# Patient Record
Sex: FEMALE | Race: WHITE | URBAN - METROPOLITAN AREA
[De-identification: names, ages, dates, MRNs, and addresses within clinical notes are randomized per-mention and may not be internally consistent; named-entity substitution may affect disease eponyms.]

---

## 2023-09-26 ENCOUNTER — EMERGENCY (EMERGENCY)
Facility: HOSPITAL | Age: 33
LOS: 0 days | Discharge: HOME | End: 2023-09-26
Attending: STUDENT IN AN ORGANIZED HEALTH CARE EDUCATION/TRAINING PROGRAM
Payer: SELF-PAY

## 2023-09-26 VITALS
TEMPERATURE: 99 F | HEART RATE: 87 BPM | HEIGHT: 62 IN | DIASTOLIC BLOOD PRESSURE: 59 MMHG | WEIGHT: 102.07 LBS | RESPIRATION RATE: 18 BRPM | OXYGEN SATURATION: 99 % | SYSTOLIC BLOOD PRESSURE: 116 MMHG

## 2023-09-26 DIAGNOSIS — S09.90XA UNSPECIFIED INJURY OF HEAD, INITIAL ENCOUNTER: ICD-10-CM

## 2023-09-26 DIAGNOSIS — R11.10 VOMITING, UNSPECIFIED: ICD-10-CM

## 2023-09-26 DIAGNOSIS — M54.12 RADICULOPATHY, CERVICAL REGION: ICD-10-CM

## 2023-09-26 DIAGNOSIS — M54.2 CERVICALGIA: ICD-10-CM
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
## 2023-09-26 PROCEDURE — 99284 EMERGENCY DEPT VISIT MOD MDM: CPT

## 2023-09-26 PROCEDURE — 99283 EMERGENCY DEPT VISIT LOW MDM: CPT | Mod: 25

## 2023-09-26 PROCEDURE — 96372 THER/PROPH/DIAG INJ SC/IM: CPT

## 2023-09-26 RX ORDER — TIZANIDINE 4 MG/1
1 TABLET ORAL
Qty: 15 | Refills: 0
Start: 2023-09-26 | End: 2023-09-30

## 2023-09-26 RX ORDER — METHOCARBAMOL 500 MG/1
1500 TABLET, FILM COATED ORAL ONCE
Refills: 0 | Status: COMPLETED | OUTPATIENT
Start: 2023-09-26 | End: 2023-09-26

## 2023-09-26 RX ORDER — DEXAMETHASONE 0.5 MG/5ML
10 ELIXIR ORAL ONCE
Refills: 0 | Status: COMPLETED | OUTPATIENT
Start: 2023-09-26 | End: 2023-09-26

## 2023-09-26 RX ORDER — KETOROLAC TROMETHAMINE 30 MG/ML
30 SYRINGE (ML) INJECTION ONCE
Refills: 0 | Status: DISCONTINUED | OUTPATIENT
Start: 2023-09-26 | End: 2023-09-26

## 2023-09-26 RX ORDER — LIDOCAINE 4 G/100G
1 CREAM TOPICAL ONCE
Refills: 0 | Status: COMPLETED | OUTPATIENT
Start: 2023-09-26 | End: 2023-09-26

## 2023-09-26 RX ADMIN — Medication 30 MILLIGRAM(S): at 17:19

## 2023-09-26 RX ADMIN — Medication 8 MILLIGRAM(S): at 17:19

## 2023-09-26 RX ADMIN — LIDOCAINE 1 PATCH: 4 CREAM TOPICAL at 17:17

## 2023-09-26 RX ADMIN — METHOCARBAMOL 1500 MILLIGRAM(S): 500 TABLET, FILM COATED ORAL at 17:18
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
## 2023-09-26 NOTE — ED PROVIDER NOTE - PATIENT PORTAL LINK FT
You can access the FollowMyHealth Patient Portal offered by [facility] by registering at the following website: [email or web address]. By joining [employer]’s FollowMyHealth portal, you will also be able to view your health information using other applications (apps) compatible with our system.
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
## 2023-09-26 NOTE — ED PROVIDER NOTE - NSFOLLOWUPINSTRUCTIONS_ED_ALL_ED_FT
Please follow up with your primary care physician within 24-72 hours and return immediately if symptoms worsen.    Our Emergency Department Referral Coordinators will be reaching out to you in the next 24-48 hours from 9:00am to 5:00pm with a follow up appointment. Please expect a phone call from the hospital in that time frame. If you do not receive a call or if you have any questions or concerns, you can reach them at   [phone]     Concussion    WHAT YOU NEED TO KNOW:    A concussion is a mild brain injury. It is usually caused by a bump or blow to the head from a fall, a motor vehicle crash, or a sports injury. Sometimes being shaken forcefully may cause a concussion.    DISCHARGE INSTRUCTIONS:    Have someone call 911 for any of the following:     Someone tries to wake you and cannot do so.      You have a seizure, increasing confusion, or a change in personality.      Your speech becomes slurred, or you have new vision problems.    Return to the emergency department if:     You have sudden and new vision problems.      You have a severe headache that does not go away.      You have arm or leg weakness, numbness, or new problems with coordination.      You have blood or clear fluid coming out of the ears or nose.    Contact your healthcare provider if:     You have nausea or are vomiting.      You feel more sleepy than usual.      Your symptoms get worse.      Your symptoms last longer than 6 weeks after the injury.      You have questions or concerns about your condition or care.    Medicines: You may need any of the following:     Acetaminophen decreases pain and fever. It is available without a doctor's order. Ask how much to take and how often to take it. Follow directions. Read the labels of all other medicines you are using to see if they also contain acetaminophen, or ask your doctor or pharmacist. Acetaminophen can cause liver damage if not taken correctly. Do not use more than 4 grams (4,000 milligrams) total of acetaminophen in one day.       NSAIDs help decrease swelling and pain or fever. This medicine is available with or without a doctor's order. NSAIDs can cause stomach bleeding or kidney problems in certain people. If you take blood thinner medicine, always ask your healthcare provider if NSAIDs are safe for you. Always read the medicine label and follow directions.      Take your medicine as directed. Contact your healthcare provider if you think your medicine is not helping or if you have side effects. Tell him or her if you are allergic to any medicine. Keep a list of the medicines, vitamins, and herbs you take. Include the amounts, and when and why you take them. Bring the list or the pill bottles to follow-up visits. Carry your medicine list with you in case of an emergency.    Self-care: Concussion symptoms usually go away within about 10 days, but they may last longer. The following may be recommended to manage your symptoms:     Rest from physical and mental activities as directed. Mental activities are those that require thinking, concentration, and attention. You will need to rest until your symptoms are gone. Rest will allow you to recover from your concussion. Ask your healthcare provider when you can return to work and other daily activities.      Have someone stay with you for the first 24 hours after your injury. Your healthcare provider should be contacted if your symptoms get worse, or you develop new symptoms.      Do not participate in sports and physical activities until your healthcare provider says it is okay. They could make your symptoms worse or lead to another concussion. Your healthcare provider will tell you when it is okay for you to return to sports or physical activities. Ask for more information about sports concussions.    Prevent another concussion:     Wear protective sports equipment that fits properly. Helmets help decrease your risk for a serious brain injury. Talk to your healthcare provider about ways you can decrease your risk for a concussion if you play sports.      Wear your seatbelt every time you travel. This helps to decrease your risk for a head injury if you are in a car accident.     Follow up with your healthcare provider as directed: Write down your questions so you remember to ask them during your visits.        © Copyright [employer] 2019 All illustrations and images included in CareNotes are the copyrighted property of [employer] or [employer].         Cervical Radiculopathy    Close-up of the nerves of the cervical spine.   Cervical radiculopathy means that a nerve in the neck (a cervical nerve) is pinched or bruised. This can happen because of an injury to the cervical spine (vertebrae) in the neck, or as a normal part of getting older. This condition can cause pain or loss of feeling (numbness) that runs from your neck all the way down to your arm and fingers. Often, this condition gets better with rest. Treatment may be needed if the condition does not get better.      What are the causes?    •A neck injury.      •A bulging disk in your spine.      •Sudden muscle tightening (muscle spasms).      •Tight muscles in your neck due to overuse.      •Arthritis.      •Breakdown in the bones and joints of the spine (spondylosis) due to getting older.      •Bone spurs that form near the nerves in the neck.        What are the signs or symptoms?  •Pain. The pain may:  •Run from the neck to the arm and hand.      •Be very bad or irritating.      •Get worse when you move your neck.        •Loss of feeling or tingling in your arm or hand.      •Weakness in your arm or hand, in very bad cases.        How is this treated?    In many cases, treatment is not needed for this condition. With rest, the condition often gets better over time. If treatment is needed, options may include:  •Wearing a soft neck collar (cervical collar) for short periods of time.      •Doing exercises (physical therapy) to strengthen your neck muscles.      •Taking medicines.      •Having shots (injections) in your spine, in very bad cases.      •Having surgery. This may be needed if other treatments do not help. The type of surgery that is used will depend on the cause of your condition.        Follow these instructions at home:    If you have a soft neck collar:     •Wear it as told by your doctor. Take it off only as told by your doctor.    •Ask your doctor if you can take the collar off for cleaning and bathing. If you are allowed to take the collar off for cleaning or bathing:  •Follow instructions from your doctor about how to take off the collar safely.      •Clean the collar by wiping it with mild soap and water and drying it completely.      •Take out any removable pads in the collar every 1–2 days. Wash them by hand with soap and water. Let them air-dry completely before you put them back in the collar.      •Check your skin under the collar for redness or sores. If you see any, tell your doctor.          Managing pain       Bag of ice on a towel on the skin.       A heating pad for use on the painful area.     •Take over-the-counter and prescription medicines only as told by your doctor.    •If told, put ice on the painful area. To do this:  •If you have a soft neck collar, take if off as told by your doctor.      •Put ice in a plastic bag.      •Place a towel between your skin and the bag.      •Leave the ice on for 20 minutes, 2–3 times a day.      •Take off the ice if your skin turns bright red. This is very important. If you cannot feel pain, heat, or cold, you have a greater risk of damage to the area.      •If using ice does not help, you can try using heat. Use the heat source that your doctor recommends, such as a moist heat pack or a heating pad.  •Place a towel between your skin and the heat source.      •Leave the heat on for 20–30 minutes.      •Take off the heat if your skin turns bright red. This is very important. If you cannot feel pain, heat, or cold, you have a greater risk of getting burned.        •You may try a gentle neck and shoulder rub (massage).      Activity     •Rest as needed.      •Return to your normal activities when your doctor says that it is safe.      •Do exercises as told by your doctor or physical therapist.      •You may have to avoid lifting. Ask your doctor how much you can safely lift.      General instructions     •Use a flat pillow when you sleep.      • Do not drive while wearing a soft neck collar. If you do not have a soft neck collar, ask your doctor if it is safe to drive while your neck heals.      •Ask your doctor if you should avoid driving or using machines while you are taking your medicine.      • Do not smoke or use any products that contain nicotine or tobacco. If you need help quitting, ask your doctor.      •Keep all follow-up visits.        Contact a doctor if:    •Your condition does not get better with treatment.        Get help right away if:    •Your pain gets worse and medicine does not help.      •You lose feeling or feel weak in your hand, arm, face, or leg.      •You have a high fever.      •Your neck is stiff.      •You cannot control when you poop or pee (have incontinence).      •You have trouble with walking, balance, or talking.        Summary    •Cervical radiculopathy means that a nerve in the neck is pinched or bruised.      •A nerve can get pinched from a bulging disk, arthritis, an injury to the neck, or other causes.      •Symptoms include pain, tingling, or loss of feeling that goes from the neck to the arm or hand.      •Weakness in your arm or hand can happen in very bad cases.      •Treatment may include resting, wearing a soft neck collar, and doing exercises. You might need to take medicines for pain. In very bad cases, shots or surgery may be needed.      This information is not intended to replace advice given to you by your health care provider. Make sure you discuss any questions you have with your health care provider.      Document Revised: 06/23/2022 Document Reviewed: 06/23/2022    Elsevier Patient Education © 2023 [employer] Inc[employer]

## 2023-09-26 NOTE — ED ADULT NURSE NOTE - NURSING NEURO LEVEL OF CONSCIOUSNESS
alert and awake
60 y/o WM evaluated for colonoscopy.  ASA 2.  Plan IV sedation / GA backup.  Plan, benefits, foreseeable risks, viable alternatives discussed with patient and all his pertinent questions answered and he understands and elects to proceed.
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
## 2023-10-16 ENCOUNTER — APPOINTMENT (OUTPATIENT)
Dept: NEUROPSYCHOLOGY | Facility: CLINIC | Age: 33
End: 2023-10-16

## 2023-10-18 PROBLEM — Z00.00 ENCOUNTER FOR PREVENTIVE HEALTH EXAMINATION: Status: ACTIVE | Noted: 2023-10-18

## 2023-10-24 ENCOUNTER — APPOINTMENT (OUTPATIENT)
Dept: NEUROSURGERY | Facility: CLINIC | Age: 33
End: 2023-10-24

## 2024-02-05 ENCOUNTER — EMERGENCY (EMERGENCY)
Facility: HOSPITAL | Age: 34
LOS: 0 days | Discharge: HOME | End: 2024-02-05
Attending: EMERGENCY MEDICINE
Payer: COMMERCIAL

## 2024-02-05 VITALS
SYSTOLIC BLOOD PRESSURE: 101 MMHG | TEMPERATURE: 98 F | DIASTOLIC BLOOD PRESSURE: 68 MMHG | HEART RATE: 77 BPM | RESPIRATION RATE: 20 BRPM | OXYGEN SATURATION: 100 % | WEIGHT: 102.07 LBS

## 2024-02-05 DIAGNOSIS — R51.9 HEADACHE, UNSPECIFIED: ICD-10-CM

## 2024-02-05 DIAGNOSIS — M54.2 CERVICALGIA: ICD-10-CM

## 2024-02-05 PROCEDURE — 99284 EMERGENCY DEPT VISIT MOD MDM: CPT

## 2024-02-05 PROCEDURE — 96374 THER/PROPH/DIAG INJ IV PUSH: CPT

## 2024-02-05 PROCEDURE — 99284 EMERGENCY DEPT VISIT MOD MDM: CPT | Mod: 25

## 2024-02-05 RX ORDER — METHOCARBAMOL 500 MG/1
2 TABLET, FILM COATED ORAL
Qty: 10 | Refills: 0
Start: 2024-02-05 | End: 2024-02-09

## 2024-02-05 RX ORDER — KETOROLAC TROMETHAMINE 30 MG/ML
30 SYRINGE (ML) INJECTION ONCE
Refills: 0 | Status: DISCONTINUED | OUTPATIENT
Start: 2024-02-05 | End: 2024-02-05

## 2024-02-05 RX ORDER — METHOCARBAMOL 500 MG/1
500 TABLET, FILM COATED ORAL ONCE
Refills: 0 | Status: COMPLETED | OUTPATIENT
Start: 2024-02-05 | End: 2024-02-05

## 2024-02-05 RX ADMIN — Medication 40 MILLIGRAM(S): at 16:53

## 2024-02-05 RX ADMIN — Medication 30 MILLIGRAM(S): at 16:59

## 2024-02-05 RX ADMIN — METHOCARBAMOL 500 MILLIGRAM(S): 500 TABLET, FILM COATED ORAL at 16:54

## 2024-02-05 NOTE — ED PROVIDER NOTE - NSFOLLOWUPINSTRUCTIONS_ED_ALL_ED_FT
SEE YOUR DOCTOR IN THE NEXT 24-48 HOURS   RETURN FOR ANY FURTHER CONCERNS     Closed Head Injury    A closed head injury is an injury to your head that may or may not involve a traumatic brain injury (TBI).  A CT scan of the head may not have been performed because they are usually normal after a concussion. Concussions are diagnosed and managed based on the history given and the symptoms experienced after the head injury. Most concussions do not cause serious problem and get better over several days.  Symptoms of TBI can be short or long lasting and include headache, dizziness, interference with memory or speech, fatigue, confusion, changes in sleep, mood changes, nausea, depression/anxiety, and dulling of senses. Make sure to obtain proper rest which includes getting plenty of sleep, avoiding excessive visual stimulation, and avoiding activities that may cause physical or mental stress. Avoid any situation where there is potential for another head injury, including sports.    SEEK IMMEDIATE MEDICAL CARE IF YOU HAVE ANY OF THE FOLLOWING SYMPTOMS: unusual drowsiness, vomiting, severe dizziness, seizures, lightheadedness, muscular weakness, different pupil sizes, visual changes, or clear or bloody discharge from your ears or nose.

## 2024-02-05 NOTE — ED PROVIDER NOTE - PRINCIPAL DIAGNOSIS
There are no preventive care reminders to display for this patient.    Patient is up to date, no discussion needed.           Neck pain

## 2024-02-05 NOTE — ED ADULT NURSE NOTE - NSFALLUNIVINTERV_ED_ALL_ED
Bed/Stretcher in lowest position, wheels locked, appropriate side rails in place/Call bell, personal items and telephone in reach/Instruct patient to call for assistance before getting out of bed/chair/stretcher/Non-slip footwear applied when patient is off stretcher/[place] to call system/Physically safe environment - no spills, clutter or unnecessary equipment/Purposeful proactive rounding/Room/bathroom lighting operational, light cord in reach

## 2024-02-05 NOTE — ED ADULT NURSE NOTE - ALCOHOL PRE SCREEN (AUDIT - C)
August 5, 2021     Tess Neely  [id number] [facility] [id number]    Dear Tess Neely:    Welcome and thank you for choosing the [facility]!  At [facility] we believe that every patient deserves the best care and we look forward to providing that for you. You have a scheduled appointment with:        Ron Marti MD   DATE: 9/8/2021  Time: 9:40 am  Arrive: 9:25 am    Please complete the attached new patient medical history forms and bring it with you to your appointment. Arrive 15 minutes early if you need assistance completing the paperwork.    If you arrive late, or do not have your new patient medical history form completed, your appointment may be rescheduled for a later date. If you need to cancel your appointment for any reason, please contact the Pain Line at [phone] at least 24 hours prior to your appointment. If you use transportation services, plan extra time for travel.     In order to provide you with the best possible care, please bring along the following to your appointment:     · The attached completed history forms  · Medical records or test results related to your condition performed outside of an [facility] location. For example: prior surgical reports, treatment records, test results, X-Ray/MRI/CT scan reports and/or images.  · Insurance cards  · Photo ID  · Any applicable co-pay    Our address is:    [address]  [address] [facility] [address]  Phone: [phone]  Fax: [phone]      If you have any questions, please do not hesitate to contact our Pain Line at            [phone], and we will be happy to assist you.  We look forward to meeting you.    Sincerely,  Faith Garcai  Manager of Clinic Operations   Statement Selected
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
## 2024-02-05 NOTE — ED PROVIDER NOTE - PATIENT PORTAL LINK FT
You can access the FollowMyHealth Patient Portal offered by [facility] by registering at the following website: [email or web address]. By joining [employer]’s FollowMyHealth portal, you will also be able to view your health information using other applications (apps) compatible with our system.

## 2024-04-22 NOTE — ED ADULT NURSE NOTE - NSFALLRISKFACTORS_ED_ALL_ED
Pt had an appointment on 4-16-24.   Tobin did not receive rx cyclobenzaprine.  Pt checked mychart.  Office visit summary.  Take rx. To the pharmacy.  Pt did not receive the rx.  Please call pt    No indicators present